# Patient Record
Sex: MALE | Employment: OTHER | URBAN - METROPOLITAN AREA
[De-identification: names, ages, dates, MRNs, and addresses within clinical notes are randomized per-mention and may not be internally consistent; named-entity substitution may affect disease eponyms.]

---

## 2019-01-01 ENCOUNTER — TELEPHONE (OUTPATIENT)
Dept: GERIATRICS | Facility: CLINIC | Age: 74
End: 2019-01-01

## 2019-01-01 ENCOUNTER — NURSING HOME VISIT (OUTPATIENT)
Dept: GERIATRICS | Facility: CLINIC | Age: 74
End: 2019-01-01
Payer: COMMERCIAL

## 2019-01-01 VITALS
DIASTOLIC BLOOD PRESSURE: 70 MMHG | RESPIRATION RATE: 18 BRPM | WEIGHT: 115 LBS | HEART RATE: 69 BPM | SYSTOLIC BLOOD PRESSURE: 114 MMHG | TEMPERATURE: 98.2 F | OXYGEN SATURATION: 96 %

## 2019-01-01 VITALS
HEART RATE: 78 BPM | RESPIRATION RATE: 18 BRPM | DIASTOLIC BLOOD PRESSURE: 87 MMHG | OXYGEN SATURATION: 96 % | WEIGHT: 121.8 LBS | TEMPERATURE: 97.7 F | SYSTOLIC BLOOD PRESSURE: 121 MMHG

## 2019-01-01 DIAGNOSIS — M54.12 CERVICAL RADICULOPATHY: ICD-10-CM

## 2019-01-01 DIAGNOSIS — C34.92 ADENOCARCINOMA OF LEFT LUNG (H): Primary | ICD-10-CM

## 2019-01-01 DIAGNOSIS — J44.1 COPD WITH ACUTE EXACERBATION (H): Primary | ICD-10-CM

## 2019-01-01 DIAGNOSIS — I26.99 OTHER PULMONARY EMBOLISM WITHOUT ACUTE COR PULMONALE, UNSPECIFIED CHRONICITY (H): ICD-10-CM

## 2019-01-01 DIAGNOSIS — C34.92 ADENOCARCINOMA OF LEFT LUNG (H): ICD-10-CM

## 2019-01-01 DIAGNOSIS — C34.90 MALIGNANT NEOPLASM OF LUNG, UNSPECIFIED LATERALITY, UNSPECIFIED PART OF LUNG (H): ICD-10-CM

## 2019-01-01 DIAGNOSIS — R07.9 CHEST PAIN, UNSPECIFIED TYPE: ICD-10-CM

## 2019-01-01 DIAGNOSIS — Z99.81 OXYGEN DEPENDENT: ICD-10-CM

## 2019-01-01 DIAGNOSIS — J44.9 CHRONIC OBSTRUCTIVE PULMONARY DISEASE, UNSPECIFIED COPD TYPE (H): ICD-10-CM

## 2019-01-01 PROCEDURE — 99310 SBSQ NF CARE HIGH MDM 45: CPT | Performed by: NURSE PRACTITIONER

## 2019-01-01 PROCEDURE — 99309 SBSQ NF CARE MODERATE MDM 30: CPT | Performed by: NURSE PRACTITIONER

## 2019-01-01 RX ORDER — OXYCODONE AND ACETAMINOPHEN 5; 325 MG/1; MG/1
1 TABLET ORAL EVERY 4 HOURS PRN
COMMUNITY
Start: 2019-01-01

## 2019-01-01 RX ORDER — GUAIFENESIN 600 MG/1
600 TABLET, EXTENDED RELEASE ORAL 2 TIMES DAILY
COMMUNITY
Start: 2019-01-01

## 2019-01-01 RX ORDER — PREDNISONE 20 MG/1
TABLET ORAL
COMMUNITY
Start: 2019-01-01

## 2019-01-01 RX ORDER — FENTANYL 25 UG/1
1 PATCH TRANSDERMAL
COMMUNITY
Start: 2019-01-01

## 2019-01-01 RX ORDER — ALBUTEROL SULFATE 90 UG/1
2 AEROSOL, METERED RESPIRATORY (INHALATION) EVERY 4 HOURS PRN
COMMUNITY
Start: 2018-01-01

## 2019-01-01 RX ORDER — FLUTICASONE PROPIONATE 220 UG/1
1 AEROSOL, METERED RESPIRATORY (INHALATION) 2 TIMES DAILY
COMMUNITY
Start: 2019-01-01

## 2019-01-01 RX ORDER — OXYCODONE AND ACETAMINOPHEN 5; 325 MG/1; MG/1
1 TABLET ORAL EVERY 6 HOURS PRN
Qty: 12 TABLET | Refills: 0 | Status: CANCELLED
Start: 2019-01-01 | End: 2019-03-27

## 2019-01-01 RX ORDER — OXYCODONE AND ACETAMINOPHEN 5; 325 MG/1; MG/1
1 TABLET ORAL EVERY 4 HOURS PRN
Qty: 60 TABLET | Refills: 0 | Status: SHIPPED | OUTPATIENT
Start: 2019-01-01

## 2019-01-01 RX ORDER — GABAPENTIN 300 MG/1
300 CAPSULE ORAL 2 TIMES DAILY
COMMUNITY
Start: 2019-01-01

## 2019-01-01 RX ORDER — FENTANYL 25 UG/1
1 PATCH TRANSDERMAL
Qty: 10 PATCH | Refills: 0 | Status: SHIPPED | OUTPATIENT
Start: 2019-01-01 | End: 2019-03-27

## 2019-01-01 RX ORDER — ONDANSETRON 4 MG/1
4 TABLET, ORALLY DISINTEGRATING ORAL EVERY 8 HOURS PRN
COMMUNITY
Start: 2018-01-01

## 2019-02-20 PROBLEM — J96.01 ACUTE HYPOXEMIC RESPIRATORY FAILURE (H): Status: ACTIVE | Noted: 2018-01-01

## 2019-02-20 PROBLEM — I26.99 PULMONARY EMBOLI (H): Status: ACTIVE | Noted: 2018-01-01

## 2019-02-20 PROBLEM — C34.90 MALIGNANT NEOPLASM OF LUNG, UNSPECIFIED LATERALITY, UNSPECIFIED PART OF LUNG (H): Status: ACTIVE | Noted: 2019-01-01

## 2019-02-20 PROBLEM — G89.29 CHRONIC PAIN: Status: ACTIVE | Noted: 2019-01-01

## 2019-02-20 PROBLEM — J44.1 COPD WITH ACUTE EXACERBATION (H): Status: ACTIVE | Noted: 2019-01-01

## 2019-02-20 PROBLEM — R06.02 SOB (SHORTNESS OF BREATH): Status: ACTIVE | Noted: 2019-01-01

## 2019-02-20 NOTE — LETTER
"    2/20/2019        RE: Hebert Prieto  [address]  [facility] [address]        [place] [facility]  PRIMARY CARE PROVIDER AND CLINIC:  No primary care provider on file., No primary physician on file.  Chief Complaint   Patient presents with     [facility] F/U       [place] Medical Record Number:  [id number]  Place of Service where encounter took place:  [facility] ([facility]) [[id number]]    HPI:    Hebert Prieto  is a 73 year old  (1945), admitted to the above facility from  [facility]. Hospital stay 2/15/19 through 2/19/19. Admitted to this facility for  rehab, medical management and nursing care. HPI information obtained from: facility chart records, facility staff, patient report and Care Everywhere Epic chart review.     Admitted to TCU from from [facility]. Was admitted from 2/15-2/19/19. Patient was receiving his first dose of keytruda for stage 4 adenocarcinoma at the infusion clinic when he reported chest pain. He was evaluated in the ER. Hs troponin was negative and he was treated symptomatically with percocet, increased dose of fentanyl patch, gabapentin and prednisone.     Met with patient in his room today. He has not started with therapy yet. He makes minimal eye contact and appears lethargic. He is wearing supplemental oxygen, no distress noted. Patient reports pain in his chest. States \"I have breast cancer\".     CODE STATUS/ADVANCE DIRECTIVES DISCUSSION:   CPR/Full code   Patient's living condition: lives alone  ALLERGIES: Patient has no known allergies.  PAST MEDICAL HISTORY:  has a past medical history of COPD (chronic obstructive pulmonary disease) (H) and Tobacco abuse.  PAST SURGICAL HISTORY:   has a past surgical history that includes appendectomy; tonsillectomy; and CT Chest Soft Tissue Biopsy.  FAMILY HISTORY: Hebert Prieto had no medications administered during this visit.  SOCIAL HISTORY:   reports that he has been " smoking.  He has quit using smokeless tobacco.  Post Discharge Medication Reconciliation Status: discharge medications reconciled, continue medications without change    Current Outpatient Medications   Medication Sig Dispense Refill     albuterol (VENTOLIN HFA) 108 (90 Base) MCG/ACT inhaler Inhale 2 puffs into the lungs every 4 hours as needed       apixaban ANTICOAGULANT (ELIQUIS) 5 MG tablet Take 5 mg by mouth 2 times daily       fluticasone (FLOVENT HFA) 220 MCG/ACT inhaler Inhale 1 puff into the lungs 2 times daily       gabapentin (NEURONTIN) 300 MG capsule Take 300 mg by mouth 2 times daily       guaiFENesin (MUCINEX) 600 MG 12 hr tablet Take 600 mg by mouth 2 times daily       ondansetron (ZOFRAN-ODT) 4 MG ODT tab Place 4 mg under the tongue every 8 hours as needed       predniSONE (DELTASONE) 20 MG tablet 20 mg PO X 2 DAYS, THEN 10 MG PO X DAYS.       umeclidinium-vilanterol (ANORO ELLIPTA) 62.5-25 MCG/INH oral inhaler Inhale 1 puff into the lungs daily       fentaNYL (DURAGESIC) 25 mcg/hr 72 hr patch Place 1 patch onto the skin every 72 hours       oxyCODONE-acetaminophen (PERCOCET) 5-325 MG tablet Take 1 tablet by mouth every 4 hours as needed         ROS:  4 point ROS including Respiratory, CV, GI and , other than that noted in the HPI,  is negative. Patient not responding to all interview questions    Exam:  /87   Pulse 78   Temp 97.7  F (36.5  C)   Resp 18   Wt 55.2 kg (121 lb 12.8 oz)   SpO2 96%   GENERAL APPEARANCE:  lethargic, in no distress  RESP:  respiratory effort and palpation of chest normal, auscultation of lungs diminished , using accessorary muscles  CV:  Palpation and auscultation of heart done , rate and rhythm regular, no peripheral edema  ABDOMEN:  normal bowel sounds, soft, nontender, no hepatosplenomegaly or other masses  M/S:   Gait and station not assessed  PSYCH:  insight and judgement, memory intact , affect and mood flat      Lab/Diagnostic  data:                    ASSESSMENT/PLAN:    Adenocarcinoma of left lung (H)  - stage 4  - following with oncology (Dr Kenney)  - frail with other co-morbidities  - Received one dose of keytruda, to ER with CP. [place] pain was multifactoral  - now in TCU for therapy    Chronic obstructive pulmonary disease, unspecified COPD type (H)  Oxygen dependent  - severe (PFT's 10/2012)  - continue mucinex and inhalers. Now on prednisone for breathing and pain  - continue albuterol, flovent, LABA/LAMA    Other pulmonary embolism without acute cor pulmonale, unspecified chronicity (H)  - Left upper lobe PE noted in [facility] Dec '18. Was started on elqiuis. Repeat Chest CT in Jan '19 with no PE noted.   - Will need to follow up with oncology re continuing eliquis    Chest pain, unspecified type  Cervical radiculopathy  - patient lethargic today. With recent increase of fentanyl patch from 12.5 to 25 mg will decrease oxycodone from Q4 to Q8 hrs to avoid oversedation  - continue gabapentin and prednisone  - Nsg to update with pain concerns       Orders:  1) Oxycodone APAP 5-325 mg 1 tab PO Q8H PRN pain, quantity of 60, no refills. DX: Pain  2) Fentanyl 25 mcg/hr patch, change patch Q72H, quantity of 10. Monitor for over sedation and respiratory distress/apnea. Update NP with pain concerns.    Total time spent with patient visit at the skilled nursing facility was 40 including patient visit and review of past records. Greater than 50% of total time spent with counseling and coordinating care due to complex conditions  Electronically signed by:  GIANCARLO Elkins CNP                     Sincerely,        GIANCARLO Elkins CNP

## 2019-02-25 NOTE — PROGRESS NOTES
[place] [facility]  [place] Medical Record Number:  [id number]  Place of Service where encounter took place:  [facility] [[id number]]  Chief Complaint   Patient presents with     Nursing Home Acute     HPI:    Hebert Prieto  is a 73 year old (1945), who is being seen today for an episodic care visit.  HPI information obtained from: facility chart records, facility staff, patient report and [facility] chart review.     Seeing patient today for a follow up.   Per pharmacy Flovent is not on formulary. Met with patient and he states he has been on this medication for many years. No changes in breathing.     Patient also upset about change of oxycodone frequency. Reminded him of our conversation last week and he does not recall. Patient states he wants his prn oxycodone available every 4 hours. Notes no improvement in pain with increase of fentanyl patch.     Past Medical and Surgical History reviewed in Epic today.    REVIEW OF SYSTEMS:  4 point ROS including Respiratory, CV, GI and , other than that noted in the HPI,  is negative    Physical Exam:  /70   Pulse 69   Temp 98.2  F (36.8  C)   Resp 18   Wt 52.2 kg (115 lb)   SpO2 96%   GENERAL APPEARANCE:  Alert, anxious    Labs:   Labs done in SNF are in [facility]. Please refer to them using [employer] Everywhere.    ASSESSMENT/PLAN:  COPD with acute exacerbation (H)  - will ask pharmacy to substitute flovent for covered medication  - continue current LAMA/LABA and prn albuterol    Malignant neoplasm of lung, unspecified laterality, unspecified part of lung (H)    Adenocarcinoma of left lung (H)    Cervical radiculopathy  - agree to increase prn oxycodone to Q 4hrs. Nsg to monitor for signs of oversedation/apnea  - Staff called NP after patient visit. Question on orders for oxycodone and patient calling home clinic. Updated nurse that patient's order was changed today.   - while patient in SNF he is to  update [facility] staff rather than his home clinic for medication pain med concerns.     Orders:  1. Oxycodone 5/325 1 tab po Q 4 hr prn pain- change in frequency  2. Can substitute flovent for advair 250/50 if covered by pharmacy.    Total time spent with patient visit at the skilled nursing facility was 25 min including patient visit, review of past records and discussion with staff. . Greater than 50% of total time spent with counseling and coordinating care due to complex conditions  Electronically signed by:  GIANCARLO Elkins CNP

## 2019-02-25 NOTE — LETTER
2/25/2019        RE: Hebert Prieto  [address]  [facility] [address]        [place] [facility]  [place] [facility] Record Number:  [id number]  Place of Service where encounter took place:  [facility] [[id number]]  Chief Complaint   Patient presents with     Nursing Home Acute     HPI:    Hebert Prieto  is a 73 year old (1945), who is being seen today for an episodic care visit.  HPI information obtained from: facility chart records, facility staff, patient report and [facility] chart review.     Seeing patient today for a follow up.   Per pharmacy Flovent is not on formulary. Met with patient and he states he has been on this medication for many years. No changes in breathing.     Patient also upset about change of oxycodone frequency. Reminded him of our conversation last week and he does not recall. Patient states he wants his prn oxycodone available every 4 hours. Notes no improvement in pain with increase of fentanyl patch.     Past Medical and Surgical History reviewed in Epic today.    REVIEW OF SYSTEMS:  4 point ROS including Respiratory, CV, GI and , other than that noted in the HPI,  is negative    Physical Exam:  /70   Pulse 69   Temp 98.2  F (36.8  C)   Resp 18   Wt 52.2 kg (115 lb)   SpO2 96%   GENERAL APPEARANCE:  Alert, anxious    Labs:   Labs done in SNF are in [facility]. Please refer to them using [employer] Everywhere.    ASSESSMENT/PLAN:  COPD with acute exacerbation (H)  - will ask pharmacy to substitute flovent for covered medication  - continue current LAMA/LABA and prn albuterol    Malignant neoplasm of lung, unspecified laterality, unspecified part of lung (H)    Adenocarcinoma of left lung (H)    Cervical radiculopathy  - agree to increase prn oxycodone to Q 4hrs. Nsg to monitor for signs of oversedation/apnea  - Staff called NP after patient visit. Question on orders for oxycodone and patient calling home clinic.  Updated nurse that patient's order was changed today.   - while patient in SNF he is to update nsg staff rather than his home clinic for medication pain med concerns.     Orders:  1. Oxycodone 5/325 1 tab po Q 4 hr prn pain- change in frequency  2. Can substitute flovent for advair 250/50 if covered by pharmacy.    Total time spent with patient visit at the skilled nursing facility was 25 min including patient visit, review of past records and discussion with staff. . Greater than 50% of total time spent with counseling and coordinating care due to complex conditions  Electronically signed by:  GIANCARLO Elkins CNP           Sincerely,        GIANCARLO Elkins CNP

## 2019-03-04 NOTE — TELEPHONE ENCOUNTER
Staff nurse called 4pm, refusing meds x24 hours, new incontinence, sats drop to 84 at rest, on O2, will not answer questions today, potential COPD exacerbation, asked if he wanted to go to ER and just looked at the nurse, VS WNL:    Prednisone 30mg daily X3 days, first dose tonite  Duoneb QID  Ellipta place on hold, duoneb may be more effective  VS Qshift  UA/UC stat  HOB 30 degrees at bedtime  Follow up with PCP

## 2023-09-26 NOTE — PROGRESS NOTES
"[place] [facility]  PRIMARY CARE PROVIDER AND CLINIC:  No primary care provider on file., No primary physician on file.  Chief Complaint   Patient presents with     [facility] F/U       [place] Medical Record Number:  [id number]  Place of Service where encounter took place:  [facility] ([facility]) [[id number]]    HPI:    Hebert Prieto  is a 73 year old  (1945), admitted to the above facility from  [facility]. Hospital stay 2/15/19 through 2/19/19. Admitted to this facility for  rehab, medical management and nursing care. HPI information obtained from: facility chart records, facility staff, patient report and Care Everywhere Epic chart review.     Admitted to TCU from from [facility]. Was admitted from 2/15-2/19/19. Patient was receiving his first dose of keytruda for stage 4 adenocarcinoma at the infusion clinic when he reported chest pain. He was evaluated in the ER. Hs troponin was negative and he was treated symptomatically with percocet, increased dose of fentanyl patch, gabapentin and prednisone.     Met with patient in his room today. He has not started with therapy yet. He makes minimal eye contact and appears lethargic. He is wearing supplemental oxygen, no distress noted. Patient reports pain in his chest. States \"I have breast cancer\".     CODE STATUS/ADVANCE DIRECTIVES DISCUSSION:   CPR/Full code   Patient's living condition: lives alone  ALLERGIES: Patient has no known allergies.  PAST MEDICAL HISTORY:  has a past medical history of COPD (chronic obstructive pulmonary disease) (H) and Tobacco abuse.  PAST SURGICAL HISTORY:   has a past surgical history that includes appendectomy; tonsillectomy; and CT Chest Soft Tissue Biopsy.  FAMILY HISTORY: Hebert Prieto had no medications administered during this visit.  SOCIAL HISTORY:   reports that he has been smoking.  He has quit using smokeless tobacco.  Post Discharge Medication Reconciliation " Status: discharge medications reconciled, continue medications without change    Current Outpatient Medications   Medication Sig Dispense Refill     albuterol (VENTOLIN HFA) 108 (90 Base) MCG/ACT inhaler Inhale 2 puffs into the lungs every 4 hours as needed       apixaban ANTICOAGULANT (ELIQUIS) 5 MG tablet Take 5 mg by mouth 2 times daily       fluticasone (FLOVENT HFA) 220 MCG/ACT inhaler Inhale 1 puff into the lungs 2 times daily       gabapentin (NEURONTIN) 300 MG capsule Take 300 mg by mouth 2 times daily       guaiFENesin (MUCINEX) 600 MG 12 hr tablet Take 600 mg by mouth 2 times daily       ondansetron (ZOFRAN-ODT) 4 MG ODT tab Place 4 mg under the tongue every 8 hours as needed       predniSONE (DELTASONE) 20 MG tablet 20 mg PO X 2 DAYS, THEN 10 MG PO X DAYS.       umeclidinium-vilanterol (ANORO ELLIPTA) 62.5-25 MCG/INH oral inhaler Inhale 1 puff into the lungs daily       fentaNYL (DURAGESIC) 25 mcg/hr 72 hr patch Place 1 patch onto the skin every 72 hours       oxyCODONE-acetaminophen (PERCOCET) 5-325 MG tablet Take 1 tablet by mouth every 4 hours as needed         ROS:  4 point ROS including Respiratory, CV, GI and , other than that noted in the HPI,  is negative. Patient not responding to all interview questions    Exam:  /87   Pulse 78   Temp 97.7  F (36.5  C)   Resp 18   Wt 55.2 kg (121 lb 12.8 oz)   SpO2 96%   GENERAL APPEARANCE:  lethargic, in no distress  RESP:  respiratory effort and palpation of chest normal, auscultation of lungs diminished , using accessorary muscles  CV:  Palpation and auscultation of heart done , rate and rhythm regular, no peripheral edema  ABDOMEN:  normal bowel sounds, soft, nontender, no hepatosplenomegaly or other masses  M/S:   Gait and station not assessed  PSYCH:  insight and judgement, memory intact , affect and mood flat      Lab/Diagnostic data:                    ASSESSMENT/PLAN:    Adenocarcinoma of left lung (H)  - stage 4  - following with oncology  (Dr Kenney)  - frail with other co-morbidities  - Received one dose of keytruda, to ER with CP. [place] pain was multifactoral  - now in TCU for therapy    Chronic obstructive pulmonary disease, unspecified COPD type (H)  Oxygen dependent  - severe (PFT's 10/2012)  - continue mucinex and inhalers. Now on prednisone for breathing and pain  - continue albuterol, flovent, LABA/LAMA    Other pulmonary embolism without acute cor pulmonale, unspecified chronicity (H)  - Left upper lobe PE noted in [facility] Dec '18. Was started on elqiuis. Repeat Chest CT in Jan '19 with no PE noted.   - Will need to follow up with oncology re continuing eliquis    Chest pain, unspecified type  Cervical radiculopathy  - patient lethargic today. With recent increase of fentanyl patch from 12.5 to 25 mg will decrease oxycodone from Q4 to Q8 hrs to avoid oversedation  - continue gabapentin and prednisone  - Nsg to update with pain concerns       Orders:  1) Oxycodone APAP 5-325 mg 1 tab PO Q8H PRN pain, quantity of 60, no refills. DX: Pain  2) Fentanyl 25 mcg/hr patch, change patch Q72H, quantity of 10. Monitor for over sedation and respiratory distress/apnea. Update NP with pain concerns.    Total time spent with patient visit at the skilled nursing facility was 40 including patient visit and review of past records. Greater than 50% of total time spent with counseling and coordinating care due to complex conditions  Electronically signed by:  GIANCARLO Elkins CNP                  no